# Patient Record
Sex: FEMALE | Race: WHITE | NOT HISPANIC OR LATINO | URBAN - METROPOLITAN AREA
[De-identification: names, ages, dates, MRNs, and addresses within clinical notes are randomized per-mention and may not be internally consistent; named-entity substitution may affect disease eponyms.]

---

## 2021-10-02 ENCOUNTER — EMERGENCY (EMERGENCY)
Facility: HOSPITAL | Age: 66
LOS: 1 days | Discharge: HOME | End: 2021-10-02
Attending: EMERGENCY MEDICINE
Payer: COMMERCIAL

## 2021-10-02 VITALS
OXYGEN SATURATION: 99 % | HEART RATE: 74 BPM | DIASTOLIC BLOOD PRESSURE: 80 MMHG | SYSTOLIC BLOOD PRESSURE: 134 MMHG | TEMPERATURE: 98 F | RESPIRATION RATE: 18 BRPM

## 2021-10-02 VITALS
RESPIRATION RATE: 16 BRPM | SYSTOLIC BLOOD PRESSURE: 135 MMHG | OXYGEN SATURATION: 97 % | DIASTOLIC BLOOD PRESSURE: 84 MMHG | HEIGHT: 65 IN | HEART RATE: 88 BPM | TEMPERATURE: 99 F | WEIGHT: 143.96 LBS

## 2021-10-02 PROCEDURE — 73110 X-RAY EXAM OF WRIST: CPT

## 2021-10-02 PROCEDURE — 99283 EMERGENCY DEPT VISIT LOW MDM: CPT | Mod: 25

## 2021-10-02 PROCEDURE — 99284 EMERGENCY DEPT VISIT MOD MDM: CPT | Mod: 25

## 2021-10-02 PROCEDURE — 29125 APPL SHORT ARM SPLINT STATIC: CPT | Mod: RT

## 2021-10-02 PROCEDURE — 73110 X-RAY EXAM OF WRIST: CPT | Mod: 26,RT

## 2021-10-02 RX ORDER — IBUPROFEN 200 MG
600 TABLET ORAL ONCE
Refills: 0 | Status: COMPLETED | OUTPATIENT
Start: 2021-10-02 | End: 2021-10-02

## 2021-10-02 RX ADMIN — Medication 600 MILLIGRAM(S): at 12:14

## 2021-10-02 NOTE — ED PROVIDER NOTE - PATIENT PORTAL LINK FT
You can access the FollowMyHealth Patient Portal offered by [facility] by registering at the following website: [email or web address]. By joining [employer]’s FollowMyHealth portal, you will also be able to view your health information using other applications (apps) compatible with our system.

## 2021-10-02 NOTE — ED PROVIDER NOTE - PHYSICAL EXAMINATION
General: well appearing, alert, AOX3  Head: atraumatic, trachea midline  Vascular: Radial pulses intact b/l, b/l hands warm and swollen  Skin: no skin changes RUE  Musculoskeletal: Sensation intact in RUE, pain improves on removal of cast on hand, normal strength in RUE, point tenderness in R wrist  Neurological: alert, oriented, no motor or sensory deficits  Other: ring stuck on R ring finger

## 2021-10-02 NOTE — ED PROVIDER NOTE - CCCP TRG CHIEF CMPLNT
R fingers swelling/cold s/p cast placement 2days ago R fingers swelling/cold s/p splint placement 2days ago

## 2021-10-02 NOTE — ED ADULT TRIAGE NOTE - CHIEF COMPLAINT QUOTE
R fingers swelling/cold s/p cast placement 2days ago    +cap refill brisk, +sensation in triage - no redness noted

## 2021-10-02 NOTE — ED PROVIDER NOTE - CLINICAL SUMMARY MEDICAL DECISION MAKING FREE TEXT BOX
salina 70 f sp [facility] fall 2 days of with rt distal rad fx seen at outside ed with reduction and splinting -- now w inc pain and swelling in fingers tingling today -- sensation intact cap refill nml -- splint taken down w complete resolution of s/s good distal pulse soft compartments - sensation intact--- ring removed will reimage and splint and fu ortho

## 2021-10-02 NOTE — ED PROVIDER NOTE - NS ED ROS FT
Gen: No fever, no fatigue  CV: No chest pain, no palpitations  HEENT: No sore throat  Skin: No rash, no color changes, no abrasions  Resp: No SOB, no cough  GI: No constipation, no diarrhea, no nausea, no vomiting  Msk: No back pain, R hand swelling under cast, R finger and wrist pain  Neuro: No LOC, no weakness, no numbness

## 2021-10-02 NOTE — ED PROVIDER NOTE - ATTENDING CONTRIBUTION TO CARE
I performed a history and physical exam of the patient and discussed their management with the student and resident. I reviewed the student and resident's note and agree with the documented findings and plan of care, lexcept as noted.. My medical decision making and observations are found above.

## 2021-10-02 NOTE — ED ADULT NURSE NOTE - OBJECTIVE STATEMENT
67 yo F pt with recent right sided distal radial fx splinted at [facility] p/w R sided finger swelling, tingling and pain x 1 day. pt has tried nothing for relief.   splint removed with pain relief, PMS intact to R wrist/hand, FROM of fingers.  Pt denies: loss of sensation, headache, dizziness, chest pain, palpitations, cough, SOB, abdominal pain, n/v/d, urinary symptoms, fevers, chills, weakness at this time.

## 2021-10-02 NOTE — ED ADULT NURSE NOTE - NSIMPLEMENTINTERV_GEN_ALL_ED
Implemented All Fall Risk Interventions:  [place] to call system. Call bell, personal items and telephone within reach. Instruct patient to call for assistance. Room bathroom lighting operational. Non-slip footwear when patient is off stretcher. Physically safe environment: no spills, clutter or unnecessary equipment. Stretcher in lowest position, wheels locked, appropriate side rails in place. Provide visual cue, wrist band, yellow gown, etc. Monitor gait and stability. Monitor for mental status changes and reorient to person, place, and time. Review medications for side effects contributing to fall risk. Reinforce activity limits and safety measures with patient and family.

## 2021-10-02 NOTE — ED PROVIDER NOTE - OBJECTIVE STATEMENT
The patient is a 66y female complaining of R finger pain and swelling, s/p mechanical fall and cast placement 2 days ago. X-ray at outside hospital shows comminuted distal radial fracture. The patient developed R finger pain and swelling over the last day and took percocet for her pain. She reports intact sensation in her fingers. She denies fever, chills, and pain outside of the R wrist and R fingers.

## 2021-10-02 NOTE — ED PROVIDER NOTE - NSFOLLOWUPINSTRUCTIONS_ED_ALL_ED_FT
The results of the imaging study completed during your visit today were discussed and explained to you and a copy provided with your discharge instructions. Please follow up with your primary care doctor and an orthopedic doctor within 48 hours.     Orthopedics: [phone]

## 2023-10-31 NOTE — ED PROVIDER NOTE - IV ALTEPLASE DOOR HIDDEN
Strep Pharyngitis    You have had a positive test for strep throat. Strep throat is a contagious illness. It is spread by coughing, kissing or by touching others after touching your mouth or nose. Symptoms include throat pain that is worse with swallowing, aching all over, headache, and fever. It is treated with antibiotic medicine. This should help you start to feel better in 1 to 2 days.  Take antibiotic medicine for the full 10 days, even if you feel better. This is very important to ensure the infection is treated. It is also important to prevent medicine-resistant germs from developing.        - Change toothbrush after resolution of symptoms and completion of antibiotic therapy    - Rest at home.     - Drink plenty of fluids so you won't get dehydrated.    - Fever/Pain recommendations:  Alternate Tylenol or Motrin every 4 - 6 hours as needed for fever, pain or fussiness.    -Sore throat recommendations: Warm fluids, soup, or drinking something cold or frozen.    -Congestion recommendations: Over-the-counter Children's Mucinex or over the counter Saline Nasal Spray or Flonase Kids as directed for nasal congestion.  Saline Nasal Spray with bulb suction as needed for nasal congestion; perform during the day and especially before eating and bedtime    -Cough recommendations: Over-the-counter Children's Delsym     -Allergy recommendations: Over-the-counter Children's Children's Claritin or Zyrtec       Returning to work/school:  No work or school for the first 2 days of taking the antibiotics. After this time, you will not be contagious. You can then return to school or work if you are feeling better.     Follow up with your Pediatrician in the next 48hrs or sooner for re-eval especially if no improvement in symptoms    When to seek medical advice  Call your healthcare provider right away if any of these occur:  Fever that is poorly controlled with OTC fever reducing medication  New or worsening ear pain, sinus  pain, or headache  Stiff neck  You can't swallow liquids or you can't open your mouth wide because of throat pain  Signs of dehydration. These include very dark urine or no urine, sunken eyes, and dizziness.  Trouble breathing or noisy breathing  Muffled voice  Rash     - If your condition worsens or fails to improve we recommend that you receive another evaluation at the ER immediately or contact your PCP/Pediatrician to discuss your concerns or return here.      Pharyngitis: Strep (Confirmed)      show

## 2024-12-06 NOTE — ED PROVIDER NOTE - NSICDXPASTMEDICALHX_GEN_ALL_CORE_FT
What Type Of Note Output Would You Prefer (Optional)?: Standard Output How Severe Are Your Spot(S)?: mild Have Your Spot(S) Been Treated In The Past?: has not been treated Hpi Title: Evaluation of Skin Lesions Location: Right upper arm PAST MEDICAL HISTORY:  No pertinent past medical history

## 2025-03-31 ENCOUNTER — APPOINTMENT (OUTPATIENT)
Dept: VASCULAR SURGERY | Facility: CLINIC | Age: 70
End: 2025-03-31

## 2025-06-20 ENCOUNTER — NON-APPOINTMENT (OUTPATIENT)
Age: 70
End: 2025-06-20

## 2025-06-20 ENCOUNTER — APPOINTMENT (OUTPATIENT)
Dept: OPHTHALMOLOGY | Facility: CLINIC | Age: 70
End: 2025-06-20
Payer: MEDICARE

## 2025-06-20 PROCEDURE — 76514 ECHO EXAM OF EYE THICKNESS: CPT

## 2025-06-20 PROCEDURE — 92134 CPTRZ OPH DX IMG PST SGM RTA: CPT

## 2025-06-20 PROCEDURE — 92025 CPTRIZED CORNEAL TOPOGRAPHY: CPT

## 2025-06-20 PROCEDURE — 92002 INTRM OPH EXAM NEW PATIENT: CPT

## 2025-08-04 ENCOUNTER — APPOINTMENT (OUTPATIENT)
Dept: OPHTHALMOLOGY | Facility: CLINIC | Age: 70
End: 2025-08-04